# Patient Record
Sex: FEMALE | Race: BLACK OR AFRICAN AMERICAN | NOT HISPANIC OR LATINO | Employment: FULL TIME | URBAN - NONMETROPOLITAN AREA
[De-identification: names, ages, dates, MRNs, and addresses within clinical notes are randomized per-mention and may not be internally consistent; named-entity substitution may affect disease eponyms.]

---

## 2024-05-06 ENCOUNTER — OFFICE VISIT (OUTPATIENT)
Dept: FAMILY MEDICINE | Facility: CLINIC | Age: 34
End: 2024-05-06
Payer: COMMERCIAL

## 2024-05-06 VITALS
DIASTOLIC BLOOD PRESSURE: 60 MMHG | RESPIRATION RATE: 18 BRPM | OXYGEN SATURATION: 98 % | BODY MASS INDEX: 38.61 KG/M2 | HEART RATE: 65 BPM | HEIGHT: 62 IN | TEMPERATURE: 99 F | SYSTOLIC BLOOD PRESSURE: 90 MMHG | WEIGHT: 209.81 LBS

## 2024-05-06 DIAGNOSIS — N76.0 ACUTE VAGINITIS: Primary | ICD-10-CM

## 2024-05-06 LAB
BILIRUB SERPL-MCNC: NEGATIVE MG/DL
BLOOD URINE, POC: NEGATIVE
COLOR, POC UA: YELLOW
GLUCOSE UR QL STRIP: NEGATIVE
KETONES UR QL STRIP: NEGATIVE
LEUKOCYTE ESTERASE URINE, POC: NEGATIVE
NITRITE, POC UA: NEGATIVE
PH, POC UA: 7
PROTEIN, POC: ABNORMAL
SPECIFIC GRAVITY, POC UA: 1.02
UROBILINOGEN, POC UA: 4

## 2024-05-06 PROCEDURE — 99203 OFFICE O/P NEW LOW 30 MIN: CPT | Mod: ,,, | Performed by: NURSE PRACTITIONER

## 2024-05-06 PROCEDURE — 81003 URINALYSIS AUTO W/O SCOPE: CPT | Mod: QW,,, | Performed by: NURSE PRACTITIONER

## 2024-05-06 RX ORDER — BUPROPION HYDROCHLORIDE 150 MG/1
150 TABLET ORAL EVERY MORNING
COMMUNITY
Start: 2024-04-12

## 2024-05-06 RX ORDER — FERROUS SULFATE TAB 325 MG (65 MG ELEMENTAL FE) 325 (65 FE) MG
325 TAB ORAL 2 TIMES DAILY
COMMUNITY
Start: 2024-04-12

## 2024-05-06 RX ORDER — LEVONORGESTREL AND ETHINYL ESTRADIOL 100-20(84)
1 KIT ORAL DAILY
COMMUNITY
Start: 2024-02-20

## 2024-05-06 NOTE — LETTER
May 6, 2024      [facility] [address] [place] [address]  Phone: [phone]  Fax: [phone]       Patient: Ania Espinosa   YOB: 1990  Date of Visit: 05/06/2024    To Whom It May Concern:    Dae Espinosa  was at [facility] on 05/06/2024. The patient may return to work/school on 5/7/24 with no restrictions. If you have any questions or concerns, or if I can be of further assistance, please do not hesitate to contact me.    Sincerely,    RAY Sanchez DNP, FNP-C

## 2024-05-06 NOTE — PROGRESS NOTES
Mary Washington DNP, MARK ANTHONY    [address] [place] [address]     PATIENT NAME: Ania Espinosa  : 1990  DATE: 24  MRN: [id number]      Billing Provider: Mary Washington DNP, FNP  Level of Service:   Patient PCP Information       Provider PCP Type    Primary Doctor No General            Reason for Visit / Chief Complaint: Vaginitis (Pt says she has had recurring BV since 2021 with unknown cause.  She went to a GYN at the women[facility] clinic in [facility] and was put on birth control to see if the getting her hormones regulated helps.  She denies burning or itching.  )       Update PCP  Update Chief Complaint         History of Present Illness / Problem Focused Workflow     Ania Espinosa presents to the clinic with Vaginitis (Pt says she has had recurring BV since 2021 with unknown cause.  She went to a GYN at the women's clinic in [facility] and was put on birth control to see if the getting her hormones regulated helps.  She denies burning or itching.  )   Pt denies any odor or vaginal discharge.   Pt requesting a work limitation letter due to pain/irritation and certain job duties increase the intensity of the discomfort.     Review of Systems     Review of Systems   Constitutional:  Negative for activity change, appetite change, chills, fatigue and fever.   HENT:  Negative for nasal congestion, ear pain, hearing loss, postnasal drip and sore throat.    Respiratory:  Negative for cough, chest tightness, shortness of breath and wheezing.    Cardiovascular:  Negative for chest pain, palpitations, leg swelling and claudication.   Gastrointestinal:  Negative for abdominal pain, change in bowel habit, constipation, diarrhea, nausea and vomiting.   Genitourinary:  Positive for vaginal pain. Negative for dysuria, genital sores, vaginal discharge and vaginal dryness.   Musculoskeletal:  Negative for arthralgias, back pain, gait problem and  "myalgias.   Integumentary:  Negative for rash.   Neurological:  Negative for weakness and headaches.   Psychiatric/Behavioral:  Negative for suicidal ideas. The patient is not nervous/anxious.         Medical / Social / Family History     Past Medical History:   Diagnosis Date    Anemia     Depression        Past Surgical History:   Procedure Laterality Date    NO PAST SURGERIES         Social History  Ms. Ania Espinosa  reports that she has never smoked. She has been exposed to tobacco smoke. She has never used smokeless tobacco. She reports that she does not currently use alcohol. She reports that she does not use drugs.    Family History  Ms. Ania Espinosa's family history includes COPD in her mother; Diabetes in her mother; Heart disease in her mother; Kidney disease in her mother.    Medications and Allergies     Medications  Current Outpatient Medications   Medication Sig Dispense Refill    FEROSUL 325 mg (65 mg iron) Tab tablet Take 325 mg by mouth 2 (two) times daily.      L norgest/e.estradioL-e.estrad 0.1 mg-20 mcg (84)/10 mcg (7) 3MPk Take 1 tablet by mouth Daily.      buPROPion (WELLBUTRIN XL) 150 MG TB24 tablet Take 150 mg by mouth every morning. (Patient not taking: Reported on 5/6/2024)       No current facility-administered medications for this visit.       Allergies  Review of patient's allergies indicates:  No Known Allergies    Physical Examination     Vitals:    05/06/24 1348   BP: 90/60   BP Location: Left arm   Patient Position: Sitting   BP Method: Large (Automatic)   Pulse: 65   Resp: 18   Temp: 98.7 °F (37.1 °C)   TempSrc: Oral   SpO2: 98%   Weight: 95.2 kg (209 lb 12.8 oz)   Height: 5' 2" (1.575 m)     Physical Exam  Vitals and nursing note reviewed.   Constitutional:       General: She is not in acute distress.  HENT:      Nose: Nose normal.      Mouth/Throat:      Mouth: Mucous membranes are moist.   Eyes:      Pupils: Pupils are equal, round, and reactive to light.   Cardiovascular:      " Rate and Rhythm: Normal rate and regular rhythm.      Pulses: Normal pulses.      Heart sounds: Normal heart sounds. No murmur heard.  Pulmonary:      Effort: Pulmonary effort is normal. No respiratory distress.      Breath sounds: Normal breath sounds. No wheezing, rhonchi or rales.   Chest:      Chest wall: No tenderness.   Abdominal:      General: Bowel sounds are normal.      Palpations: Abdomen is soft.   Musculoskeletal:         General: Normal range of motion.      Cervical back: Normal range of motion and neck supple.      Right lower leg: No edema.      Left lower leg: No edema.   Skin:     General: Skin is warm and dry.   Neurological:      General: No focal deficit present.      Mental Status: She is alert and oriented to person, place, and time.          Assessment and Plan (including Health Maintenance)      Problem List  Smart Sets  Document Outside HM   :    Plan:     Recommend patient obtain letter of restrictions from gyn.     Health Maintenance Due   Topic Date Due    Hepatitis C Screening  Never done    Cervical Cancer Screening  Never done    Lipid Panel  Never done    HIV Screening  Never done    COVID-19 Vaccine (1 - 2023-24 season) Never done       Problem List Items Addressed This Visit    None  Visit Diagnoses       Acute vaginitis    -  Primary    Relevant Orders    POCT URINALYSIS W/O SCOPE (Completed)          Acute vaginitis  -     POCT URINALYSIS W/O SCOPE       Health Maintenance Topics with due status: Not Due       Topic Last Completion Date    TETANUS VACCINE 10/14/2015    Influenza Vaccine 10/14/2015           No future appointments.     Follow up if symptoms worsen or fail to improve.     Signature:  Mary Washington DNP, FNP  [address] [place] [address]  Phone #: [phone]  Fax #: [phone]    Date of encounter: 5/6/24    Patient Instructions   Keep follow up with gyn in May. Recommend A & D ointment to perineal area. Recommend  women's probioitics.

## 2024-05-06 NOTE — PATIENT INSTRUCTIONS
Keep follow up with gyn in May. Recommend A & D ointment to perineal area. Recommend women's probioitics.